# Patient Record
Sex: MALE | Race: WHITE | Employment: OTHER
[De-identification: names, ages, dates, MRNs, and addresses within clinical notes are randomized per-mention and may not be internally consistent; named-entity substitution may affect disease eponyms.]

---

## 2022-03-18 PROBLEM — H25.89 OTHER AGE-RELATED CATARACT: Status: ACTIVE | Noted: 2021-03-29

## 2022-03-18 PROBLEM — Z86.010 HX OF COLONIC POLYPS: Status: ACTIVE | Noted: 2019-08-20

## 2022-03-18 PROBLEM — Z86.0100 HX OF COLONIC POLYPS: Status: ACTIVE | Noted: 2019-08-20

## 2022-03-19 PROBLEM — I83.891 VARICOSE VEINS OF LEG WITH EDEMA, RIGHT: Status: ACTIVE | Noted: 2020-08-26

## 2024-04-19 SDOH — HEALTH STABILITY: PHYSICAL HEALTH: ON AVERAGE, HOW MANY DAYS PER WEEK DO YOU ENGAGE IN MODERATE TO STRENUOUS EXERCISE (LIKE A BRISK WALK)?: 4 DAYS

## 2024-04-19 SDOH — HEALTH STABILITY: PHYSICAL HEALTH: ON AVERAGE, HOW MANY MINUTES DO YOU ENGAGE IN EXERCISE AT THIS LEVEL?: 90 MIN

## 2024-04-22 ENCOUNTER — OFFICE VISIT (OUTPATIENT)
Dept: INTERNAL MEDICINE CLINIC | Facility: CLINIC | Age: 69
End: 2024-04-22
Payer: MEDICARE

## 2024-04-22 VITALS
BODY MASS INDEX: 27.57 KG/M2 | DIASTOLIC BLOOD PRESSURE: 84 MMHG | SYSTOLIC BLOOD PRESSURE: 127 MMHG | OXYGEN SATURATION: 97 % | HEIGHT: 73 IN | TEMPERATURE: 98.1 F | HEART RATE: 58 BPM | WEIGHT: 208 LBS

## 2024-04-22 DIAGNOSIS — M25.542 ARTHRALGIA OF LEFT HAND: ICD-10-CM

## 2024-04-22 DIAGNOSIS — Z12.5 PROSTATE CANCER SCREENING: ICD-10-CM

## 2024-04-22 DIAGNOSIS — E78.2 MIXED HYPERLIPIDEMIA: ICD-10-CM

## 2024-04-22 DIAGNOSIS — M25.542 ARTHRALGIA OF LEFT HAND: Primary | ICD-10-CM

## 2024-04-22 DIAGNOSIS — Z00.00 MEDICARE ANNUAL WELLNESS VISIT, INITIAL: ICD-10-CM

## 2024-04-22 DIAGNOSIS — H91.93 BILATERAL HEARING LOSS, UNSPECIFIED HEARING LOSS TYPE: ICD-10-CM

## 2024-04-22 LAB
ALBUMIN SERPL-MCNC: 4.1 G/DL (ref 3.2–4.6)
ALBUMIN/GLOB SERPL: 1.6 (ref 0.4–1.6)
ALP SERPL-CCNC: 57 U/L (ref 50–136)
ALT SERPL-CCNC: 28 U/L (ref 12–65)
ANION GAP SERPL CALC-SCNC: 5 MMOL/L (ref 2–11)
APPEARANCE UR: CLEAR
AST SERPL-CCNC: 25 U/L (ref 15–37)
BASOPHILS # BLD: 0 K/UL (ref 0–0.2)
BASOPHILS NFR BLD: 1 % (ref 0–2)
BILIRUB DIRECT SERPL-MCNC: 0.2 MG/DL
BILIRUB SERPL-MCNC: 0.7 MG/DL (ref 0.2–1.1)
BILIRUB UR QL: NEGATIVE
BUN SERPL-MCNC: 19 MG/DL (ref 8–23)
CALCIUM SERPL-MCNC: 9.8 MG/DL (ref 8.3–10.4)
CHLORIDE SERPL-SCNC: 109 MMOL/L (ref 103–113)
CHOLEST SERPL-MCNC: 140 MG/DL
CO2 SERPL-SCNC: 25 MMOL/L (ref 21–32)
COLOR UR: NORMAL
CREAT SERPL-MCNC: 1.1 MG/DL (ref 0.8–1.5)
CRP SERPL-MCNC: <0.3 MG/DL (ref 0–0.9)
DIFFERENTIAL METHOD BLD: ABNORMAL
EOSINOPHIL # BLD: 0 K/UL (ref 0–0.8)
EOSINOPHIL NFR BLD: 0 % (ref 0.5–7.8)
ERYTHROCYTE [DISTWIDTH] IN BLOOD BY AUTOMATED COUNT: 12.1 % (ref 11.9–14.6)
ERYTHROCYTE [SEDIMENTATION RATE] IN BLOOD: 1 MM/HR
GLOBULIN SER CALC-MCNC: 2.6 G/DL (ref 2.8–4.5)
GLUCOSE SERPL-MCNC: 87 MG/DL (ref 65–100)
GLUCOSE UR STRIP.AUTO-MCNC: NEGATIVE MG/DL
HCT VFR BLD AUTO: 41.7 % (ref 41.1–50.3)
HDLC SERPL-MCNC: 63 MG/DL (ref 40–60)
HDLC SERPL: 2.2
HGB BLD-MCNC: 14.4 G/DL (ref 13.6–17.2)
HGB UR QL STRIP: NEGATIVE
IMM GRANULOCYTES # BLD AUTO: 0 K/UL (ref 0–0.5)
IMM GRANULOCYTES NFR BLD AUTO: 0 % (ref 0–5)
KETONES UR QL STRIP.AUTO: NEGATIVE MG/DL
LDLC SERPL CALC-MCNC: 61.4 MG/DL
LEUKOCYTE ESTERASE UR QL STRIP.AUTO: NEGATIVE
LYMPHOCYTES # BLD: 1 K/UL (ref 0.5–4.6)
LYMPHOCYTES NFR BLD: 12 % (ref 13–44)
MCH RBC QN AUTO: 30.7 PG (ref 26.1–32.9)
MCHC RBC AUTO-ENTMCNC: 34.5 G/DL (ref 31.4–35)
MCV RBC AUTO: 88.9 FL (ref 82–102)
MONOCYTES # BLD: 0.5 K/UL (ref 0.1–1.3)
MONOCYTES NFR BLD: 6 % (ref 4–12)
NEUTS SEG # BLD: 6.5 K/UL (ref 1.7–8.2)
NEUTS SEG NFR BLD: 81 % (ref 43–78)
NITRITE UR QL STRIP.AUTO: NEGATIVE
NRBC # BLD: 0 K/UL (ref 0–0.2)
PH UR STRIP: 5 (ref 5–9)
PLATELET # BLD AUTO: 153 K/UL (ref 150–450)
PMV BLD AUTO: 12.1 FL (ref 9.4–12.3)
POTASSIUM SERPL-SCNC: 4 MMOL/L (ref 3.5–5.1)
PROT SERPL-MCNC: 6.7 G/DL (ref 6.3–8.2)
PROT UR STRIP-MCNC: NEGATIVE MG/DL
PSA SERPL-MCNC: 0.9 NG/ML
RBC # BLD AUTO: 4.69 M/UL (ref 4.23–5.6)
SODIUM SERPL-SCNC: 139 MMOL/L (ref 136–146)
SP GR UR REFRACTOMETRY: 1.02 (ref 1–1.02)
TRIGL SERPL-MCNC: 78 MG/DL (ref 35–150)
TSH, 3RD GENERATION: 1.78 UIU/ML (ref 0.36–3.74)
UROBILINOGEN UR QL STRIP.AUTO: 1 EU/DL (ref 0.2–1)
VLDLC SERPL CALC-MCNC: 15.6 MG/DL (ref 6–23)
WBC # BLD AUTO: 8.1 K/UL (ref 4.3–11.1)

## 2024-04-22 PROCEDURE — 1123F ACP DISCUSS/DSCN MKR DOCD: CPT | Performed by: INTERNAL MEDICINE

## 2024-04-22 PROCEDURE — 1036F TOBACCO NON-USER: CPT | Performed by: INTERNAL MEDICINE

## 2024-04-22 PROCEDURE — G0438 PPPS, INITIAL VISIT: HCPCS | Performed by: INTERNAL MEDICINE

## 2024-04-22 PROCEDURE — 3017F COLORECTAL CA SCREEN DOC REV: CPT | Performed by: INTERNAL MEDICINE

## 2024-04-22 PROCEDURE — 99204 OFFICE O/P NEW MOD 45 MIN: CPT | Performed by: INTERNAL MEDICINE

## 2024-04-22 PROCEDURE — G8427 DOCREV CUR MEDS BY ELIG CLIN: HCPCS | Performed by: INTERNAL MEDICINE

## 2024-04-22 PROCEDURE — G8417 CALC BMI ABV UP PARAM F/U: HCPCS | Performed by: INTERNAL MEDICINE

## 2024-04-22 RX ORDER — ATORVASTATIN CALCIUM 20 MG/1
20 TABLET, FILM COATED ORAL DAILY
COMMUNITY
Start: 2024-02-01

## 2024-04-22 SDOH — ECONOMIC STABILITY: FOOD INSECURITY: WITHIN THE PAST 12 MONTHS, THE FOOD YOU BOUGHT JUST DIDN'T LAST AND YOU DIDN'T HAVE MONEY TO GET MORE.: NEVER TRUE

## 2024-04-22 SDOH — ECONOMIC STABILITY: INCOME INSECURITY: HOW HARD IS IT FOR YOU TO PAY FOR THE VERY BASICS LIKE FOOD, HOUSING, MEDICAL CARE, AND HEATING?: NOT HARD AT ALL

## 2024-04-22 SDOH — ECONOMIC STABILITY: FOOD INSECURITY: WITHIN THE PAST 12 MONTHS, YOU WORRIED THAT YOUR FOOD WOULD RUN OUT BEFORE YOU GOT MONEY TO BUY MORE.: NEVER TRUE

## 2024-04-22 SDOH — ECONOMIC STABILITY: HOUSING INSECURITY
IN THE LAST 12 MONTHS, WAS THERE A TIME WHEN YOU DID NOT HAVE A STEADY PLACE TO SLEEP OR SLEPT IN A SHELTER (INCLUDING NOW)?: NO

## 2024-04-22 ASSESSMENT — LIFESTYLE VARIABLES
HOW OFTEN DO YOU HAVE A DRINK CONTAINING ALCOHOL: NEVER
HOW MANY STANDARD DRINKS CONTAINING ALCOHOL DO YOU HAVE ON A TYPICAL DAY: PATIENT DOES NOT DRINK

## 2024-04-22 ASSESSMENT — ENCOUNTER SYMPTOMS
DIARRHEA: 0
EYE ITCHING: 0
NAUSEA: 0
WHEEZING: 0
BACK PAIN: 0
COLOR CHANGE: 0
VOICE CHANGE: 0
VOMITING: 0
SORE THROAT: 0
EYE DISCHARGE: 0
ANAL BLEEDING: 0
RHINORRHEA: 0
ABDOMINAL PAIN: 0
COUGH: 0
BLOOD IN STOOL: 0
SHORTNESS OF BREATH: 0
CONSTIPATION: 0

## 2024-04-22 ASSESSMENT — PATIENT HEALTH QUESTIONNAIRE - PHQ9
SUM OF ALL RESPONSES TO PHQ QUESTIONS 1-9: 0
SUM OF ALL RESPONSES TO PHQ QUESTIONS 1-9: 0
2. FEELING DOWN, DEPRESSED OR HOPELESS: NOT AT ALL
1. LITTLE INTEREST OR PLEASURE IN DOING THINGS: NOT AT ALL
SUM OF ALL RESPONSES TO PHQ9 QUESTIONS 1 & 2: 0
SUM OF ALL RESPONSES TO PHQ QUESTIONS 1-9: 0
SUM OF ALL RESPONSES TO PHQ QUESTIONS 1-9: 0

## 2024-04-22 ASSESSMENT — ANXIETY QUESTIONNAIRES
IF YOU CHECKED OFF ANY PROBLEMS ON THIS QUESTIONNAIRE, HOW DIFFICULT HAVE THESE PROBLEMS MADE IT FOR YOU TO DO YOUR WORK, TAKE CARE OF THINGS AT HOME, OR GET ALONG WITH OTHER PEOPLE: NOT DIFFICULT AT ALL
2. NOT BEING ABLE TO STOP OR CONTROL WORRYING: NOT AT ALL
6. BECOMING EASILY ANNOYED OR IRRITABLE: NOT AT ALL
1. FEELING NERVOUS, ANXIOUS, OR ON EDGE: NOT AT ALL
GAD7 TOTAL SCORE: 0
7. FEELING AFRAID AS IF SOMETHING AWFUL MIGHT HAPPEN: NOT AT ALL
5. BEING SO RESTLESS THAT IT IS HARD TO SIT STILL: NOT AT ALL
3. WORRYING TOO MUCH ABOUT DIFFERENT THINGS: NOT AT ALL
4. TROUBLE RELAXING: NOT AT ALL

## 2024-04-22 NOTE — PROGRESS NOTES
Uli Gavin M.D.  [place] [address]  Phone: [phone]  Fax: [phone]    Hand Pain   Incident onset: No specific incident.  New problem. Incident location: Plays Pickleball. There was no injury mechanism. The pain is present in the right hand (1st MCP joint.  Scattered PIPs/DIPs.). The quality of the pain is described as aching. The pain does not radiate. The pain is moderate. The pain has been Fluctuating since the incident. Pertinent negatives include no chest pain. The symptoms are aggravated by movement and palpation. He has tried nothing for the symptoms.        Lukas Maki is a 68 y.o. White (non-) male.     No current outpatient medications on file.     No current facility-administered medications for this visit.     Allergies   Allergen Reactions    Codeine Other (See Comments)     Says it made him feel warm    Sulfa Antibiotics Nausea Only     Past Medical History:   Diagnosis Date    Colon polyp     History of cancer 2011    of vocal cords  with excision and radiation     Past Surgical History:   Procedure Laterality Date    APPENDECTOMY      COLONOSCOPY  2019    ORTHOPEDIC SURGERY  1998    removed bursa on right elbow    OTHER SURGICAL HISTORY      cancer on the vocal cords     Social History     Tobacco Use    Smoking status: Former     Current packs/day: 0.00     Types: Cigarettes     Quit date: 1997     Years since quittin.3    Smokeless tobacco: Former     Quit date: 1997    Tobacco comments:     Quit smoking: used smokeless tobacco - and quit that   Substance Use Topics    Alcohol use: No    Drug use: No     Family History   Problem Relation Age of Onset    Other Brother         varicose vein     Heart Disease Mother     COPD Father     No Known Problems Sister       Review of Systems   Constitutional:  Negative for chills, diaphoresis, fatigue and fever.   HENT:  Positive for hearing loss.

## 2024-04-22 NOTE — PATIENT INSTRUCTIONS
screenings are paid in full while other may be subject to a deductible, co-insurance, and/or copay.    Some of these benefits include a comprehensive review of your medical history including lifestyle, illnesses that may run in your family, and various assessments and screenings as appropriate.    After reviewing your medical record and screening and assessments performed today your provider may have ordered immunizations, labs, imaging, and/or referrals for you.  A list of these orders (if applicable) as well as your Preventive Care list are included within your After Visit Summary for your review.    Other Preventive Recommendations:    A preventive eye exam performed by an eye specialist is recommended every 1-2 years to screen for glaucoma; cataracts, macular degeneration, and other eye disorders.  A preventive dental visit is recommended every 6 months.  Try to get at least 150 minutes of exercise per week or 10,000 steps per day on a pedometer .  Order or download the FREE \"Exercise & Physical Activity: Your Everyday Guide\" from [employer]. Call 1-344.302.7439 or search [employer] online.  You need 1568-1077 mg of calcium and 1238-1914 IU of vitamin D per day. It is possible to meet your calcium requirement with diet alone, but a vitamin D supplement is usually necessary to meet this goal.  When exposed to the sun, use a sunscreen that protects against both UVA and UVB radiation with an SPF of 30 or greater. Reapply every 2 to 3 hours or after sweating, drying off with a towel, or swimming.  Always wear a seat belt when traveling in a car. Always wear a helmet when riding a bicycle or motorcycle.

## 2024-04-23 LAB
CCP IGA+IGG SERPL IA-ACNC: 4 UNITS (ref 0–19)
RHEUMATOID FACT SER QL LA: NEGATIVE

## 2024-06-20 ENCOUNTER — OFFICE VISIT (OUTPATIENT)
Dept: INTERNAL MEDICINE CLINIC | Facility: CLINIC | Age: 69
End: 2024-06-20
Payer: MEDICARE

## 2024-06-20 VITALS
BODY MASS INDEX: 27.57 KG/M2 | DIASTOLIC BLOOD PRESSURE: 83 MMHG | SYSTOLIC BLOOD PRESSURE: 129 MMHG | HEART RATE: 55 BPM | OXYGEN SATURATION: 95 % | TEMPERATURE: 98 F | HEIGHT: 73 IN | WEIGHT: 208 LBS | RESPIRATION RATE: 16 BRPM

## 2024-06-20 DIAGNOSIS — J02.9 PHARYNGITIS, UNSPECIFIED ETIOLOGY: Primary | ICD-10-CM

## 2024-06-20 PROCEDURE — 3017F COLORECTAL CA SCREEN DOC REV: CPT | Performed by: INTERNAL MEDICINE

## 2024-06-20 PROCEDURE — 1036F TOBACCO NON-USER: CPT | Performed by: INTERNAL MEDICINE

## 2024-06-20 PROCEDURE — 99213 OFFICE O/P EST LOW 20 MIN: CPT | Performed by: INTERNAL MEDICINE

## 2024-06-20 PROCEDURE — 1123F ACP DISCUSS/DSCN MKR DOCD: CPT | Performed by: INTERNAL MEDICINE

## 2024-06-20 PROCEDURE — G8417 CALC BMI ABV UP PARAM F/U: HCPCS | Performed by: INTERNAL MEDICINE

## 2024-06-20 PROCEDURE — G8427 DOCREV CUR MEDS BY ELIG CLIN: HCPCS | Performed by: INTERNAL MEDICINE

## 2024-06-20 RX ORDER — DOXYCYCLINE HYCLATE 100 MG
100 TABLET ORAL 2 TIMES DAILY
Qty: 20 TABLET | Refills: 0 | Status: SHIPPED | OUTPATIENT
Start: 2024-06-20 | End: 2024-06-30

## 2024-06-20 ASSESSMENT — ENCOUNTER SYMPTOMS
SORE THROAT: 1
SWOLLEN GLANDS: 0
COUGH: 0

## 2024-06-20 NOTE — PROGRESS NOTES
Uli Gavin M.D.  [place] [address]  Phone: [phone]  Fax: [phone]    Pharyngitis  This is a new problem. The current episode started in the past 7 days (2 days.). The problem occurs constantly. The problem has been unchanged. Associated symptoms include a sore throat. Pertinent negatives include no arthralgias, chills, congestion, coughing, diaphoresis, fever, headaches or swollen glands. Nothing aggravates the symptoms. Treatments tried: Zyrtec. The treatment provided mild relief.        Lukas Maki is a 69 y.o. White (non-) male.     Current Outpatient Medications   Medication Sig Dispense Refill    atorvastatin (LIPITOR) 20 MG tablet Take 1 tablet by mouth daily       No current facility-administered medications for this visit.     Allergies   Allergen Reactions    Codeine Other (See Comments)     Says it made him feel warm    Sulfa Antibiotics Nausea Only    Epinephrine Palpitations     Past Medical History:   Diagnosis Date    Colon polyp     History of cancer     of vocal cords  with excision and radiation    HLD (hyperlipidemia)      Past Surgical History:   Procedure Laterality Date    APPENDECTOMY      COLONOSCOPY  2019    ORTHOPEDIC SURGERY      removed bursa on right elbow    OTHER SURGICAL HISTORY      cancer on the vocal cords     Social History     Tobacco Use    Smoking status: Former     Current packs/day: 0.00     Average packs/day: 0.5 packs/day for 22.0 years (11.0 ttl pk-yrs)     Types: Cigarettes     Start date: 1975     Quit date: 1997     Years since quittin.4    Smokeless tobacco: Former     Quit date: 1997    Tobacco comments:     Quit smoking: used smokeless tobacco - and quit that   Substance Use Topics    Alcohol use: No    Drug use: No     Family History   Problem Relation Age of Onset    Heart Disease Mother     COPD Father     No Known Problems Sister     Other

## 2024-10-02 NOTE — TELEPHONE ENCOUNTER
Uli Gavin MD, patient out of refills of atorvastatin. Rx pended for your signature/modification as appropriate    Last Visit: 24  Next Visit: 10/25/24    Thank you,  Evon Salmon, PharmD, [facility]  Population Health Pharmacist  [facility]  Department, toll free: [phone], option 1  =======================================================================================  POPULATION HEALTH CLINICAL PHARMACY: ADHERENCE REVIEW  Identified care gap per [employer]: fills at [facility]: Statin adherence    ASSESSMENT  STATIN ADHERENCE    Insurance Records claims through 2024 (Prior Year PDC = not reported; YTD PDC = 76%; Potential Fail Date: 10/4/24):   Atorvastatin 20 mg tab last filled on 24 for 90 day supply. Next refill due: 24  Last Rxer SAMMY MAGALLANES - prior [facility] PCP; est care with Dr. Gavin 24    Prescribed si tablet/capsule daily - historical on med list    Per Reconcile Dispense History:   ATORVASTATIN 20MG TABLETS 2024 90 90 each Sammy Magallanes MD [facility] #...   ATORVASTATIN 20MG TABLETS 2024 90 90 each Sammy Magallanes MD [facility]...   ATORVASTATIN 20MG TABLETS 10/05/2023 90 90 each Sammy Magallanes MD [facility] #...   ATORVASTATIN 20MG TABLETS 2023 90 90 each Sammy Magallanes MD [facility] #...   ATORVASTATIN 20MG TABLETS 2023 30 30 each Sammy Magallanes MD [facility] #...   0RR per hyperlink; per [facility] last Rx 2/1/24 x 90 day supply 1 refill - believe needs refill Rx from new PCP    Lab Results   Component Value Date    CHOL 140 2024    TRIG 78 2024    HDL 63 (H) 2024     Lab Results   Component Value Date    LDL 61.4 2024      ALT   Date Value Ref Range Status   2024 28 12 - 65 U/L Final     AST   Date Value Ref Range Status   2024 25 15 - 37 U/L Final     The 10-year ASCVD risk score (Lida PRICE, et al., 2019) is: 10.6%    Values used to

## 2024-10-03 RX ORDER — ATORVASTATIN CALCIUM 20 MG/1
20 TABLET, FILM COATED ORAL DAILY
Qty: 90 TABLET | Refills: 0 | Status: SHIPPED | OUTPATIENT
Start: 2024-10-03

## 2024-10-04 NOTE — TELEPHONE ENCOUNTER
Rx signed by provider - thank you!    MyChart message sent to patient.    For Pharmacy Admin Tracking Only    Program: [facility]  CPA in place:  No  Recommendation Provided To: Provider: 1 via Note to Provider  Intervention Detail: Adherence Monitorin and Refill(s) Provided  Intervention Accepted By: Provider: 1  Gap Closed?: No   Time Spent (min): 15

## 2024-10-07 RX ORDER — ATORVASTATIN CALCIUM 20 MG/1
20 TABLET, FILM COATED ORAL DAILY
Qty: 90 TABLET | Refills: 0 | OUTPATIENT
Start: 2024-10-07

## 2024-10-25 ENCOUNTER — OFFICE VISIT (OUTPATIENT)
Dept: INTERNAL MEDICINE CLINIC | Facility: CLINIC | Age: 69
End: 2024-10-25

## 2024-10-25 VITALS
HEIGHT: 73 IN | HEART RATE: 51 BPM | OXYGEN SATURATION: 99 % | TEMPERATURE: 97.3 F | WEIGHT: 213.8 LBS | BODY MASS INDEX: 28.34 KG/M2 | SYSTOLIC BLOOD PRESSURE: 156 MMHG | DIASTOLIC BLOOD PRESSURE: 91 MMHG

## 2024-10-25 DIAGNOSIS — E78.2 MIXED HYPERLIPIDEMIA: ICD-10-CM

## 2024-10-25 DIAGNOSIS — E78.2 MIXED HYPERLIPIDEMIA: Primary | ICD-10-CM

## 2024-10-25 DIAGNOSIS — R03.0 ELEVATED BP WITHOUT DIAGNOSIS OF HYPERTENSION: ICD-10-CM

## 2024-10-25 PROBLEM — I83.891 VARICOSE VEINS OF LEG WITH EDEMA, RIGHT: Status: RESOLVED | Noted: 2020-08-26 | Resolved: 2024-10-25

## 2024-10-25 PROBLEM — H25.89 OTHER AGE-RELATED CATARACT: Status: RESOLVED | Noted: 2021-03-29 | Resolved: 2024-10-25

## 2024-10-25 PROBLEM — Z86.0100 HX OF COLONIC POLYPS: Status: RESOLVED | Noted: 2019-08-20 | Resolved: 2024-10-25

## 2024-10-25 LAB
ALBUMIN SERPL-MCNC: 3.8 G/DL (ref 3.2–4.6)
ALBUMIN/GLOB SERPL: 1.4 (ref 1–1.9)
ALP SERPL-CCNC: 51 U/L (ref 40–129)
ALT SERPL-CCNC: 20 U/L (ref 8–55)
ANION GAP SERPL CALC-SCNC: 7 MMOL/L (ref 9–18)
AST SERPL-CCNC: 28 U/L (ref 15–37)
BASOPHILS # BLD: 0 K/UL (ref 0–0.2)
BASOPHILS NFR BLD: 1 % (ref 0–2)
BILIRUB DIRECT SERPL-MCNC: <0.2 MG/DL (ref 0–0.4)
BILIRUB SERPL-MCNC: 0.7 MG/DL (ref 0–1.2)
BUN SERPL-MCNC: 21 MG/DL (ref 8–23)
CALCIUM SERPL-MCNC: 9.7 MG/DL (ref 8.8–10.2)
CHLORIDE SERPL-SCNC: 104 MMOL/L (ref 98–107)
CO2 SERPL-SCNC: 28 MMOL/L (ref 20–28)
CREAT SERPL-MCNC: 1.04 MG/DL (ref 0.8–1.3)
DIFFERENTIAL METHOD BLD: ABNORMAL
EOSINOPHIL # BLD: 0.1 K/UL (ref 0–0.8)
EOSINOPHIL NFR BLD: 1 % (ref 0.5–7.8)
ERYTHROCYTE [DISTWIDTH] IN BLOOD BY AUTOMATED COUNT: 12.2 % (ref 11.9–14.6)
GLOBULIN SER CALC-MCNC: 2.8 G/DL (ref 2.3–3.5)
GLUCOSE SERPL-MCNC: 97 MG/DL (ref 70–99)
HCT VFR BLD AUTO: 44.9 % (ref 41.1–50.3)
HGB BLD-MCNC: 15 G/DL (ref 13.6–17.2)
IMM GRANULOCYTES # BLD AUTO: 0 K/UL (ref 0–0.5)
IMM GRANULOCYTES NFR BLD AUTO: 1 % (ref 0–5)
LYMPHOCYTES # BLD: 1.4 K/UL (ref 0.5–4.6)
LYMPHOCYTES NFR BLD: 21 % (ref 13–44)
MCH RBC QN AUTO: 30 PG (ref 26.1–32.9)
MCHC RBC AUTO-ENTMCNC: 33.4 G/DL (ref 31.4–35)
MCV RBC AUTO: 89.8 FL (ref 82–102)
MONOCYTES # BLD: 0.7 K/UL (ref 0.1–1.3)
MONOCYTES NFR BLD: 10 % (ref 4–12)
NEUTS SEG # BLD: 4.3 K/UL (ref 1.7–8.2)
NEUTS SEG NFR BLD: 66 % (ref 43–78)
NRBC # BLD: 0 K/UL (ref 0–0.2)
PLATELET # BLD AUTO: 131 K/UL (ref 150–450)
PMV BLD AUTO: 11.8 FL (ref 9.4–12.3)
POTASSIUM SERPL-SCNC: 4.4 MMOL/L (ref 3.5–5.1)
PROT SERPL-MCNC: 6.6 G/DL (ref 6.3–8.2)
RBC # BLD AUTO: 5 M/UL (ref 4.23–5.6)
SODIUM SERPL-SCNC: 140 MMOL/L (ref 136–145)
WBC # BLD AUTO: 6.5 K/UL (ref 4.3–11.1)

## 2024-10-25 RX ORDER — ATORVASTATIN CALCIUM 20 MG/1
20 TABLET, FILM COATED ORAL DAILY
Qty: 90 TABLET | Refills: 1 | Status: SHIPPED | OUTPATIENT
Start: 2024-10-25

## 2024-10-25 ASSESSMENT — PATIENT HEALTH QUESTIONNAIRE - PHQ9
SUM OF ALL RESPONSES TO PHQ QUESTIONS 1-9: 0
SUM OF ALL RESPONSES TO PHQ QUESTIONS 1-9: 0
2. FEELING DOWN, DEPRESSED OR HOPELESS: NOT AT ALL
SUM OF ALL RESPONSES TO PHQ9 QUESTIONS 1 & 2: 0
SUM OF ALL RESPONSES TO PHQ QUESTIONS 1-9: 0
SUM OF ALL RESPONSES TO PHQ QUESTIONS 1-9: 0
1. LITTLE INTEREST OR PLEASURE IN DOING THINGS: NOT AT ALL

## 2024-10-25 NOTE — PROGRESS NOTES
Uli Gavin M.D.  [place] [address]  Phone: [phone]  Fax: [phone]    Hyperlipidemia  This is a chronic problem.      Lukas Maki is a 69 y.o. White (non-) male.     Current Outpatient Medications   Medication Sig Dispense Refill    atorvastatin (LIPITOR) 20 MG tablet Take 1 tablet by mouth daily 90 tablet 0     No current facility-administered medications for this visit.     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)      Past Surgical History:   Procedure Laterality Date    APPENDECTOMY      COLONOSCOPY  2019    ORTHOPEDIC SURGERY  1998    removed bursa on right elbow    OTHER SURGICAL HISTORY      cancer on the vocal cords     Social History     Tobacco Use    Smoking status: Former     Current packs/day: 0.00     Average packs/day: 0.5 packs/day for 22.0 years (11.0 ttl pk-yrs)     Types: Cigarettes     Start date: 1975     Quit date: 1997     Years since quittin.8    Smokeless tobacco: Former     Quit date: 1997   Vaping Use    Vaping status: Never Used   Substance Use Topics    Alcohol use: No    Drug use: No     Family History   Problem Relation Age of Onset    Heart Disease Mother     COPD Father     No Known Problems Sister     Cancer Neg Hx     Diabetes Neg Hx       Review of Systems   Physical Exam  Vitals and nursing note reviewed.   Constitutional:       General: He is not in acute distress.     Appearance: Normal appearance. He is not ill-appearing, toxic-appearing or diaphoretic.   HENT:      Head: Normocephalic and atraumatic.   Cardiovascular:      Rate and Rhythm: Normal rate and regular rhythm.      Heart sounds: Normal heart sounds. No murmur heard.     No friction rub. No gallop.   Pulmonary:      Effort: Pulmonary effort is normal. No respiratory distress.      Breath sounds:

## 2024-11-20 ENCOUNTER — OFFICE VISIT (OUTPATIENT)
Dept: ENT CLINIC | Age: 69
End: 2024-11-20
Payer: MEDICARE

## 2024-11-20 ENCOUNTER — OFFICE VISIT (OUTPATIENT)
Dept: AUDIOLOGY | Age: 69
End: 2024-11-20
Payer: MEDICARE

## 2024-11-20 VITALS
HEIGHT: 73 IN | BODY MASS INDEX: 28.6 KG/M2 | WEIGHT: 215.8 LBS | DIASTOLIC BLOOD PRESSURE: 82 MMHG | SYSTOLIC BLOOD PRESSURE: 130 MMHG

## 2024-11-20 DIAGNOSIS — H93.13 TINNITUS OF BOTH EARS: Chronic | ICD-10-CM

## 2024-11-20 DIAGNOSIS — H90.3 SENSORINEURAL HEARING LOSS (SNHL) OF BOTH EARS: Primary | Chronic | ICD-10-CM

## 2024-11-20 DIAGNOSIS — H90.3 SENSORINEURAL HEARING LOSS, BILATERAL: Primary | ICD-10-CM

## 2024-11-20 PROCEDURE — 1123F ACP DISCUSS/DSCN MKR DOCD: CPT | Performed by: PHYSICIAN ASSISTANT

## 2024-11-20 PROCEDURE — 92557 COMPREHENSIVE HEARING TEST: CPT | Performed by: AUDIOLOGIST

## 2024-11-20 PROCEDURE — 3017F COLORECTAL CA SCREEN DOC REV: CPT | Performed by: PHYSICIAN ASSISTANT

## 2024-11-20 PROCEDURE — 1125F AMNT PAIN NOTED PAIN PRSNT: CPT | Performed by: PHYSICIAN ASSISTANT

## 2024-11-20 PROCEDURE — G8427 DOCREV CUR MEDS BY ELIG CLIN: HCPCS | Performed by: PHYSICIAN ASSISTANT

## 2024-11-20 PROCEDURE — G8417 CALC BMI ABV UP PARAM F/U: HCPCS | Performed by: PHYSICIAN ASSISTANT

## 2024-11-20 PROCEDURE — 1036F TOBACCO NON-USER: CPT | Performed by: PHYSICIAN ASSISTANT

## 2024-11-20 PROCEDURE — 1160F RVW MEDS BY RX/DR IN RCRD: CPT | Performed by: PHYSICIAN ASSISTANT

## 2024-11-20 PROCEDURE — 92567 TYMPANOMETRY: CPT | Performed by: AUDIOLOGIST

## 2024-11-20 PROCEDURE — 1159F MED LIST DOCD IN RCRD: CPT | Performed by: PHYSICIAN ASSISTANT

## 2024-11-20 PROCEDURE — 99204 OFFICE O/P NEW MOD 45 MIN: CPT | Performed by: PHYSICIAN ASSISTANT

## 2024-11-20 PROCEDURE — G8484 FLU IMMUNIZE NO ADMIN: HCPCS | Performed by: PHYSICIAN ASSISTANT

## 2024-11-20 RX ORDER — BISACODYL 5 MG/1
10 TABLET, DELAYED RELEASE ORAL ONCE
COMMUNITY
Start: 2024-11-14

## 2024-11-20 RX ORDER — POLYETHYLENE GLYCOL 3350, SODIUM CHLORIDE, SODIUM BICARBONATE, POTASSIUM CHLORIDE 420; 11.2; 5.72; 1.48 G/4L; G/4L; G/4L; G/4L
4000 POWDER, FOR SOLUTION ORAL ONCE
COMMUNITY
Start: 2024-11-14

## 2024-11-20 RX ORDER — TRIAMCINOLONE ACETONIDE 1 MG/G
CREAM TOPICAL
COMMUNITY
Start: 2024-10-15

## 2024-11-20 ASSESSMENT — ENCOUNTER SYMPTOMS
EYES NEGATIVE: 1
GASTROINTESTINAL NEGATIVE: 1
RESPIRATORY NEGATIVE: 1
ALLERGIC/IMMUNOLOGIC NEGATIVE: 1

## 2024-11-20 NOTE — PROGRESS NOTES
AUDIOLOGY EVALUATION    Lukas Maki had Tympanometry and Audiometry performed today.    The patient reports hearing loss.     Results as follows:    Tympanometry    Type A -  on right  Type Ad -  on left    Audiometry    Test Performed - Comprehensive Audiogram    Type of Loss - Right Ear: abnormal hearing: degree of loss is normal to moderately severe sensorineural hearing loss                           Left Ear: abnormal hearing: degree of loss is normal to moderately severe sensorineural hearing loss     SRT   Measurement Right Ear Left Ear   Value 20 20   Unit dB dB     Discrimination  Measurement Right Ear Left Ear   Value 76% 64%   Unit dB dB     Recommend  Binaural amplification and annual audios    Taty Joy [facility]  Audiologist

## 2024-11-20 NOTE — PROGRESS NOTES
Lukas Maki is a 69 y.o. White (non-) male presents today with c/o hearing loss. Pt feels a gradual worsening over time. Non-pulsatile tinnitus is constant but not too bothersome. Pt has not worn hearing aids before. 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 He is having difficulty with loud background noise environments.     He further mentions R sided ear pain that is worse at night. He notes it as a sharp stabbing that feels deep in the ear. IT comes and goes.      Audiogram:     Left ear:  normal sloping to moderately severe SNHL  Right ear: normal sloping to moderately severe SNHL  Discrimination score: Right ear 76 % and Left ear 64 %   Tympanogram: Right ear Type A and Left ear Type Ad.       Chief Complaint   Patient presents with    New Patient    Hearing Problem     Bilateral hearing loss.  Was seen back when Nanette was here.  He had a HT done then, he just feels like his hearing has gradually decreased over the years and is getting worse.  Having some recent pain in the right ear.  Denies drainage and itching.  Mentions tinnitus that is constant.  Describes it has a high pitched ringing.         Patient Active Problem List   Diagnosis    Mixed hyperlipidemia        Reviewed and updated this visit by provider:  Tobacco  Allergies  Meds  Problems  Med Hx  Surg Hx  Fam Hx         Review of Systems   Constitutional: Negative.    HENT:  Positive for ear pain, hearing loss and tinnitus. Negative for ear discharge.    Eyes: Negative.    Respiratory: Negative.     Cardiovascular: Negative.    Gastrointestinal: Negative.    Endocrine: Negative.    Genitourinary: Negative.    Musculoskeletal: Negative.    Skin: Negative.    Allergic/Immunologic: Negative.    Neurological: Negative.    Hematological: Negative.    Psychiatric/Behavioral: Negative.          /82 (Site: Right Upper Arm, Position: Sitting)   Ht 1.854 m (6' 1\")   Wt 97.9 kg (215 lb

## 2025-04-17 ENCOUNTER — OFFICE VISIT (OUTPATIENT)
Dept: INTERNAL MEDICINE CLINIC | Facility: CLINIC | Age: 70
End: 2025-04-17

## 2025-04-17 VITALS
HEIGHT: 73 IN | TEMPERATURE: 97.3 F | DIASTOLIC BLOOD PRESSURE: 84 MMHG | RESPIRATION RATE: 16 BRPM | HEART RATE: 54 BPM | SYSTOLIC BLOOD PRESSURE: 142 MMHG | WEIGHT: 214 LBS | OXYGEN SATURATION: 97 % | BODY MASS INDEX: 28.36 KG/M2

## 2025-04-17 DIAGNOSIS — Z12.5 PROSTATE CANCER SCREENING: ICD-10-CM

## 2025-04-17 DIAGNOSIS — M54.2 NECK PAIN: ICD-10-CM

## 2025-04-17 DIAGNOSIS — E78.2 MIXED HYPERLIPIDEMIA: ICD-10-CM

## 2025-04-17 DIAGNOSIS — R03.0 WHITE COAT SYNDROME WITHOUT HYPERTENSION: ICD-10-CM

## 2025-04-17 DIAGNOSIS — Z00.00 MEDICARE ANNUAL WELLNESS VISIT, SUBSEQUENT: Primary | ICD-10-CM

## 2025-04-17 LAB
ALBUMIN SERPL-MCNC: 3.9 G/DL (ref 3.2–4.6)
ALBUMIN/GLOB SERPL: 1.6 (ref 1–1.9)
ALP SERPL-CCNC: 51 U/L (ref 40–129)
ALT SERPL-CCNC: 29 U/L (ref 8–55)
ANION GAP SERPL CALC-SCNC: 7 MMOL/L (ref 7–16)
APPEARANCE UR: CLEAR
AST SERPL-CCNC: 37 U/L (ref 15–37)
BACTERIA URNS QL MICRO: 0 /HPF
BASOPHILS # BLD: 0.04 K/UL (ref 0–0.2)
BASOPHILS NFR BLD: 0.9 % (ref 0–2)
BILIRUB DIRECT SERPL-MCNC: 0.3 MG/DL (ref 0–0.3)
BILIRUB SERPL-MCNC: 0.7 MG/DL (ref 0–1.2)
BILIRUB UR QL: NEGATIVE
BUN SERPL-MCNC: 20 MG/DL (ref 8–23)
CALCIUM SERPL-MCNC: 9.5 MG/DL (ref 8.8–10.2)
CHLORIDE SERPL-SCNC: 104 MMOL/L (ref 98–107)
CHOLEST SERPL-MCNC: 140 MG/DL (ref 0–200)
CO2 SERPL-SCNC: 29 MMOL/L (ref 20–29)
COLOR UR: NORMAL
CREAT SERPL-MCNC: 0.98 MG/DL (ref 0.8–1.3)
DIFFERENTIAL METHOD BLD: ABNORMAL
EOSINOPHIL # BLD: 0.05 K/UL (ref 0–0.8)
EOSINOPHIL NFR BLD: 1.1 % (ref 0.5–7.8)
ERYTHROCYTE [DISTWIDTH] IN BLOOD BY AUTOMATED COUNT: 12.2 % (ref 11.9–14.6)
GLOBULIN SER CALC-MCNC: 2.5 G/DL (ref 2.3–3.5)
GLUCOSE SERPL-MCNC: 91 MG/DL (ref 70–99)
GLUCOSE UR STRIP.AUTO-MCNC: NEGATIVE MG/DL
HCT VFR BLD AUTO: 44 % (ref 41.1–50.3)
HDLC SERPL-MCNC: 56 MG/DL (ref 40–60)
HDLC SERPL: 2.5 (ref 0–5)
HGB BLD-MCNC: 15.5 G/DL (ref 13.6–17.2)
HGB UR QL STRIP: NEGATIVE
IMM GRANULOCYTES # BLD AUTO: 0.01 K/UL (ref 0–0.5)
IMM GRANULOCYTES NFR BLD AUTO: 0.2 % (ref 0–5)
KETONES UR QL STRIP.AUTO: NEGATIVE MG/DL
LDLC SERPL CALC-MCNC: 70 MG/DL (ref 0–100)
LEUKOCYTE ESTERASE UR QL STRIP.AUTO: NEGATIVE
LYMPHOCYTES # BLD: 1.28 K/UL (ref 0.5–4.6)
LYMPHOCYTES NFR BLD: 27.5 % (ref 13–44)
MCH RBC QN AUTO: 30.6 PG (ref 26.1–32.9)
MCHC RBC AUTO-ENTMCNC: 35.2 G/DL (ref 31.4–35)
MCV RBC AUTO: 87 FL (ref 82–102)
MONOCYTES # BLD: 0.41 K/UL (ref 0.1–1.3)
MONOCYTES NFR BLD: 8.8 % (ref 4–12)
NEUTS SEG # BLD: 2.87 K/UL (ref 1.7–8.2)
NEUTS SEG NFR BLD: 61.5 % (ref 43–78)
NITRITE UR QL STRIP.AUTO: NEGATIVE
NRBC # BLD: 0 K/UL (ref 0–0.2)
PH UR STRIP: 5.5 (ref 5–9)
PLATELET # BLD AUTO: 130 K/UL (ref 150–450)
PMV BLD AUTO: 11.9 FL (ref 9.4–12.3)
POTASSIUM SERPL-SCNC: 4.1 MMOL/L (ref 3.5–5.1)
PROT SERPL-MCNC: 6.3 G/DL (ref 6.3–8.2)
PROT UR STRIP-MCNC: NEGATIVE MG/DL
PSA SERPL-MCNC: 0.9 NG/ML (ref 0–4)
RBC # BLD AUTO: 5.06 M/UL (ref 4.23–5.6)
SODIUM SERPL-SCNC: 141 MMOL/L (ref 136–145)
SP GR UR REFRACTOMETRY: 1.01 (ref 1–1.02)
TRIGL SERPL-MCNC: 71 MG/DL (ref 0–150)
TSH, 3RD GENERATION: 1.93 UIU/ML (ref 0.27–4.2)
UROBILINOGEN UR QL STRIP.AUTO: 0.2 EU/DL (ref 0.2–1)
VLDLC SERPL CALC-MCNC: 14 MG/DL (ref 6–23)
WBC # BLD AUTO: 4.7 K/UL (ref 4.3–11.1)

## 2025-04-17 RX ORDER — ATORVASTATIN CALCIUM 20 MG/1
20 TABLET, FILM COATED ORAL DAILY
Qty: 100 TABLET | Refills: 1 | Status: SHIPPED | OUTPATIENT
Start: 2025-04-17

## 2025-04-17 SDOH — HEALTH STABILITY: PHYSICAL HEALTH: ON AVERAGE, HOW MANY DAYS PER WEEK DO YOU ENGAGE IN MODERATE TO STRENUOUS EXERCISE (LIKE A BRISK WALK)?: 5 DAYS

## 2025-04-17 SDOH — ECONOMIC STABILITY: FOOD INSECURITY: WITHIN THE PAST 12 MONTHS, YOU WORRIED THAT YOUR FOOD WOULD RUN OUT BEFORE YOU GOT MONEY TO BUY MORE.: NEVER TRUE

## 2025-04-17 SDOH — ECONOMIC STABILITY: INCOME INSECURITY: IN THE LAST 12 MONTHS, WAS THERE A TIME WHEN YOU WERE NOT ABLE TO PAY THE MORTGAGE OR RENT ON TIME?: NO

## 2025-04-17 SDOH — ECONOMIC STABILITY: FOOD INSECURITY: WITHIN THE PAST 12 MONTHS, THE FOOD YOU BOUGHT JUST DIDN'T LAST AND YOU DIDN'T HAVE MONEY TO GET MORE.: NEVER TRUE

## 2025-04-17 SDOH — ECONOMIC STABILITY: TRANSPORTATION INSECURITY
IN THE PAST 12 MONTHS, HAS THE LACK OF TRANSPORTATION KEPT YOU FROM MEDICAL APPOINTMENTS OR FROM GETTING MEDICATIONS?: NO

## 2025-04-17 SDOH — ECONOMIC STABILITY: TRANSPORTATION INSECURITY
IN THE PAST 12 MONTHS, HAS LACK OF TRANSPORTATION KEPT YOU FROM MEETINGS, WORK, OR FROM GETTING THINGS NEEDED FOR DAILY LIVING?: NO

## 2025-04-17 SDOH — HEALTH STABILITY: PHYSICAL HEALTH: ON AVERAGE, HOW MANY MINUTES DO YOU ENGAGE IN EXERCISE AT THIS LEVEL?: 60 MIN

## 2025-04-17 ASSESSMENT — PATIENT HEALTH QUESTIONNAIRE - PHQ9
SUM OF ALL RESPONSES TO PHQ QUESTIONS 1-9: 0
SUM OF ALL RESPONSES TO PHQ QUESTIONS 1-9: 0
2. FEELING DOWN, DEPRESSED OR HOPELESS: NOT AT ALL
1. LITTLE INTEREST OR PLEASURE IN DOING THINGS: NOT AT ALL
SUM OF ALL RESPONSES TO PHQ QUESTIONS 1-9: 0
SUM OF ALL RESPONSES TO PHQ QUESTIONS 1-9: 0

## 2025-04-17 ASSESSMENT — LIFESTYLE VARIABLES
HOW OFTEN DO YOU HAVE SIX OR MORE DRINKS ON ONE OCCASION: 1
HOW MANY STANDARD DRINKS CONTAINING ALCOHOL DO YOU HAVE ON A TYPICAL DAY: 0
HOW MANY STANDARD DRINKS CONTAINING ALCOHOL DO YOU HAVE ON A TYPICAL DAY: PATIENT DOES NOT DRINK
HOW OFTEN DO YOU HAVE A DRINK CONTAINING ALCOHOL: MONTHLY OR LESS
HOW OFTEN DO YOU HAVE A DRINK CONTAINING ALCOHOL: 2

## 2025-04-17 ASSESSMENT — ENCOUNTER SYMPTOMS
BLOOD IN STOOL: 0
SHORTNESS OF BREATH: 0

## 2025-04-17 NOTE — PATIENT INSTRUCTIONS
Please arrive 20 minutes prior to your scheduled time to see the doctor to allow sufficient time for check-in and rooming.      Please bring all your prescription bottles to each appointment.      I recommend all standard healthcare maintenance and cancer screenings (Colon cancer screening if due, Lung cancer screening if due, Mammogram and Bone Density if female and appropriate, etc) and standard CDC recommended immunizations (https://www.cdc.gov/vaccines-adults/recommended-vaccines/index.html and https://www.cdc.gov/vaccines/imz-schedules/adult-easyread.html  ) as appropriate and due (unless contraindicated)  to lower your risk for potentially preventable morbidity/mortality from these diseases.     Check BP 1-2 times per week.  Call our office if BP runs above 140/80.     Recommend tobacco cessation if you use tobacco products.         Learning About Vision Tests  What are vision tests?     The four most common vision tests are visual acuity tests, refraction, visual field tests, and color vision tests.  Visual acuity (sharpness) tests  These tests are used:  To see if you need glasses or contact lenses.  To monitor an eye problem.  To check an eye injury.  Visual acuity tests are done as part of routine exams. You may also have this test when you get your 's license or apply for some types of jobs.  Visual field tests  These tests are used:  To check for vision loss in any area of your range of vision.  To screen for certain eye diseases.  To look for nerve damage after a stroke, head injury, or other problem that could reduce blood flow to the brain.  Refraction and color tests  A refraction test is done to find the right prescription for glasses and contact lenses.  A color vision test is done to check for color blindness.  Color vision is often tested as part of a routine exam. You may also have this test when you apply for a job where recognizing different colors is important, such as ,

## 2025-04-17 NOTE — PROGRESS NOTES
CBC with Auto Differential; Future  -     Lipid Panel; Future  -     Hepatic Function Panel; Future  -     TSH; Future  -     Urinalysis; Future    Prostate cancer screening  -     PSA Screening; Future      Return in about 6 months (around 10/17/2025).    Medicare Annual Wellness Visit    Lukas Maki is here for Hyperlipidemia and Medicare AWV    Assessment & Plan   Medicare annual wellness visit, subsequent  Mixed hyperlipidemia  -     Basic Metabolic Panel; Future  -     CBC with Auto Differential; Future  -     Lipid Panel; Future  -     Hepatic Function Panel; Future  -     TSH; Future  -     Urinalysis; Future  -     atorvastatin (LIPITOR) 20 MG tablet; Take 1 tablet by mouth daily, Disp-100 tablet, R-1Normal  White coat syndrome without hypertension  -     Basic Metabolic Panel; Future  -     CBC with Auto Differential; Future  -     Lipid Panel; Future  -     Hepatic Function Panel; Future  -     TSH; Future  -     Urinalysis; Future  Prostate cancer screening  -     PSA Screening; Future       Return in about 6 months (around 10/17/2025).     Subjective       Patient's complete Health Risk Assessment and screening values have been reviewed and are found in Flowsheets. The following problems were reviewed today and where indicated follow up appointments were made and/or referrals ordered.    Positive Risk Factor Screenings with Interventions:                   Vision Screen:  Do you have difficulty driving, watching TV, or doing any of your daily activities because of your eyesight?: (Patient-Rptd) No  Have you had an eye exam within the past year?: (!) (Patient-Rptd) No  Interventions:   See AVS for additional education material    Safety:  Do you have non-slip mats or non-slip surfaces or shower bars or grab bars in your shower or bathtub?: (!) (Patient-Rptd) No  Interventions:  See AVS for additional education material     Advanced Directives:  Do you have a Living Will?: (!) (Patient-Rptd)

## 2025-04-18 ENCOUNTER — RESULTS FOLLOW-UP (OUTPATIENT)
Dept: INTERNAL MEDICINE CLINIC | Facility: CLINIC | Age: 70
End: 2025-04-18

## 2025-04-22 ENCOUNTER — OFFICE VISIT (OUTPATIENT)
Age: 70
End: 2025-04-22
Payer: MEDICARE

## 2025-04-22 DIAGNOSIS — M54.2 NECK PAIN: ICD-10-CM

## 2025-04-22 DIAGNOSIS — M47.812 CERVICAL FACET JOINT SYNDROME: Primary | ICD-10-CM

## 2025-04-22 PROCEDURE — G8417 CALC BMI ABV UP PARAM F/U: HCPCS | Performed by: PHYSICIAN ASSISTANT

## 2025-04-22 PROCEDURE — 99204 OFFICE O/P NEW MOD 45 MIN: CPT | Performed by: PHYSICIAN ASSISTANT

## 2025-04-22 PROCEDURE — G8427 DOCREV CUR MEDS BY ELIG CLIN: HCPCS | Performed by: PHYSICIAN ASSISTANT

## 2025-04-22 PROCEDURE — 1123F ACP DISCUSS/DSCN MKR DOCD: CPT | Performed by: PHYSICIAN ASSISTANT

## 2025-04-22 PROCEDURE — 1036F TOBACCO NON-USER: CPT | Performed by: PHYSICIAN ASSISTANT

## 2025-04-22 PROCEDURE — 3017F COLORECTAL CA SCREEN DOC REV: CPT | Performed by: PHYSICIAN ASSISTANT

## 2025-04-22 PROCEDURE — 1159F MED LIST DOCD IN RCRD: CPT | Performed by: PHYSICIAN ASSISTANT

## 2025-04-22 RX ORDER — CELECOXIB 200 MG/1
200 CAPSULE ORAL DAILY
Qty: 30 CAPSULE | Refills: 0 | Status: SHIPPED | OUTPATIENT
Start: 2025-04-22 | End: 2025-04-22

## 2025-04-22 RX ORDER — CELECOXIB 200 MG/1
200 CAPSULE ORAL DAILY
Qty: 30 CAPSULE | Refills: 0 | Status: SHIPPED | OUTPATIENT
Start: 2025-04-22 | End: 2025-05-22

## 2025-04-22 NOTE — PROGRESS NOTES
An MRI of the Cervical spine has been ordered. A referral to PT has been ordered of the Cervical spine.

## 2025-04-25 ENCOUNTER — TELEPHONE (OUTPATIENT)
Dept: ORTHOPEDIC SURGERY | Age: 70
End: 2025-04-25

## 2025-04-25 NOTE — TELEPHONE ENCOUNTER
He is having a MRI on Monday morning and is having some anxiety. He would like something called in to [facility] in [place] for this.

## 2025-05-01 ENCOUNTER — OFFICE VISIT (OUTPATIENT)
Age: 70
End: 2025-05-01
Payer: MEDICARE

## 2025-05-01 DIAGNOSIS — M50.30 DDD (DEGENERATIVE DISC DISEASE), CERVICAL: ICD-10-CM

## 2025-05-01 DIAGNOSIS — M47.812 CERVICAL FACET JOINT SYNDROME: Primary | ICD-10-CM

## 2025-05-01 DIAGNOSIS — M48.02 CERVICAL SPINAL STENOSIS: ICD-10-CM

## 2025-05-01 DIAGNOSIS — M48.02 FORAMINAL STENOSIS OF CERVICAL REGION: ICD-10-CM

## 2025-05-01 PROCEDURE — G8417 CALC BMI ABV UP PARAM F/U: HCPCS | Performed by: PHYSICIAN ASSISTANT

## 2025-05-01 PROCEDURE — 3017F COLORECTAL CA SCREEN DOC REV: CPT | Performed by: PHYSICIAN ASSISTANT

## 2025-05-01 PROCEDURE — G8428 CUR MEDS NOT DOCUMENT: HCPCS | Performed by: PHYSICIAN ASSISTANT

## 2025-05-01 PROCEDURE — 99214 OFFICE O/P EST MOD 30 MIN: CPT | Performed by: PHYSICIAN ASSISTANT

## 2025-05-01 PROCEDURE — 1123F ACP DISCUSS/DSCN MKR DOCD: CPT | Performed by: PHYSICIAN ASSISTANT

## 2025-05-01 PROCEDURE — 1036F TOBACCO NON-USER: CPT | Performed by: PHYSICIAN ASSISTANT

## 2025-05-01 NOTE — PROGRESS NOTES
Name: Lukas Maki  YOB: 1955  Gender: male  MRN: [id number]    Chief complaint: Results (Mri review)       History of present illness:    History of Present Illness  A 69-year-old male presents with right neck pain and stiffness during overhead work, pickleball, weightlifting, and yard work.     Pain is localized to the right base of the skull extending to the upper scapula, without radiation to the shoulders or arms, and no associated weakness, numbness, or tingling. The condition has been ongoing for several years. Chiropractic care twice a week for 5 weeks and massage therapy have provided temporary relief, and OTC ibuprofen offers some relief.  The patient experiences tension headaches related to neck stiffness. There are no other medical problems.     Exercise: Pickleball, weightlifting, yard work      5/1/25: The patient presents for a follow-up regarding a cervical MRI scan.    The patient reports a positive therapeutic response to the prescribed medication, noting accelerated resolution of inflammation and a reduction in muscular tension and pain. Physical therapy is scheduled to commence on 05/13/2025. The pain remains localized to the cervical region, with no evidence of radicular symptoms extending into the upper extremities.    There have not been associated changes in fine motor skills such as handwriting and buttoning buttons. There have not been changes in gait since the onset of the symptoms. The patient has not had cervical surgery in the past.                4/22/2025     1:35 PM   AMB PAIN ASSESSMENT   Location of Pain Neck   Location Modifiers Right   Severity of Pain 5   Quality of Pain Dull;Aching;Other (Comment)    Duration of Pain Persistent   Frequency of Pain Intermittent   Date Pain First Started 3/5/2020   Limiting Behavior Some   Result of Injury No   Work-Related Injury No   Are there other pain locations you wish to document? No       Significant value

## 2025-05-13 ENCOUNTER — EVALUATION (OUTPATIENT)
Age: 70
End: 2025-05-13
Payer: MEDICARE

## 2025-05-13 DIAGNOSIS — M53.82 IMPAIRED RANGE OF MOTION OF CERVICAL SPINE: ICD-10-CM

## 2025-05-13 DIAGNOSIS — Z74.09 DECREASED FUNCTIONAL MOBILITY AND ENDURANCE: ICD-10-CM

## 2025-05-13 DIAGNOSIS — G44.86 CERVICOGENIC HEADACHE: ICD-10-CM

## 2025-05-13 DIAGNOSIS — Z78.9 IMPAIRED MOTOR CONTROL: ICD-10-CM

## 2025-05-13 DIAGNOSIS — M54.2 NECK PAIN ON RIGHT SIDE: Primary | ICD-10-CM

## 2025-05-13 DIAGNOSIS — M25.60 JOINT STIFFNESS OF SPINE: ICD-10-CM

## 2025-05-13 PROCEDURE — 97110 THERAPEUTIC EXERCISES: CPT

## 2025-05-13 PROCEDURE — 97161 PT EVAL LOW COMPLEX 20 MIN: CPT

## 2025-05-13 PROCEDURE — 97140 MANUAL THERAPY 1/> REGIONS: CPT

## 2025-05-13 NOTE — PROGRESS NOTES
[facility]  [address] [address]  Dept: [phone]     Physical Therapy Initial Assessment     Referring MD: Rebecca Escalona, GOMEZ  Diagnosis:     ICD-10-CM    1. Neck pain on right side  M54.2       2. Joint stiffness of spine  M25.60       3. Impaired range of motion of cervical spine  M53.82       4. Cervicogenic headache  G44.86       5. Impaired motor control  Z78.9       6. Decreased functional mobility and endurance  Z74.09          Surgery: n/a    Therapy precautions:None  Co-morbidities affecting plan of care: hx of Squamous cell carcinoma of vocal cord     Payor: Payor: [facility] MEDICARE /  /  /  Billing pattern: Government- total time    Time in: 1120  Time out: 1200  Total Timed Procedure Codes: 25 min, Total Time: 40 min  Modifier needed: No    Episode visit count:  1     PERTINENT MEDICAL HISTORY     Past medical and surgical history:   Past Medical History:   Diagnosis Date    Colon polyp     Headache 1980    periodic headaches    HLD (hyperlipidemia)     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  Cervical facet arthropathy.  Has reviewed the cervical spine reveals straightening of the normal lordotic curve with degenerative disc changes at C4-C6.  No acute abnormality.     X-ray impression: Degenerative changes cervical spine    MRI CERVICAL SPINE WO CONTRAST 04/28/2025  Degenerative disc changes at C4-5 with mild bulging mild ligamentum thickening with some facet arthropathy no significant

## 2025-05-16 ENCOUNTER — TREATMENT (OUTPATIENT)
Age: 70
End: 2025-05-16

## 2025-05-16 DIAGNOSIS — M25.60 JOINT STIFFNESS OF SPINE: ICD-10-CM

## 2025-05-16 DIAGNOSIS — M53.82 IMPAIRED RANGE OF MOTION OF CERVICAL SPINE: ICD-10-CM

## 2025-05-16 DIAGNOSIS — M54.2 NECK PAIN ON RIGHT SIDE: Primary | ICD-10-CM

## 2025-05-16 DIAGNOSIS — Z74.09 DECREASED FUNCTIONAL MOBILITY AND ENDURANCE: ICD-10-CM

## 2025-05-16 DIAGNOSIS — G44.86 CERVICOGENIC HEADACHE: ICD-10-CM

## 2025-05-16 DIAGNOSIS — Z78.9 IMPAIRED MOTOR CONTROL: ICD-10-CM

## 2025-05-16 NOTE — PROGRESS NOTES
[facility]  [address] [address]  Dept: [phone]     Physical Therapy Daily Note     Referring MD: Rebecca Escalona, GOMEZ  Diagnosis:     ICD-10-CM    1. Neck pain on right side  M54.2       2. Joint stiffness of spine  M25.60       3. Impaired range of motion of cervical spine  M53.82       4. Cervicogenic headache  G44.86       5. Impaired motor control  Z78.9       6. Decreased functional mobility and endurance  Z74.09          Surgery: n/a    Therapy precautions:None  Co-morbidities affecting plan of care: hx of Squamous cell carcinoma of vocal cord     Payor: Payor: [facility] MEDICARE /  /  /  Billing pattern: Government- total time    Time in: 8:57 am  Time out: 9:54  Modifier needed: No    Episode visit count:  Visit count could not be calculated. Make sure you are using a visit which is associated with an episode.     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  Cervical facet arthropathy.  Has reviewed the cervical spine reveals straightening of the normal lordotic curve with degenerative disc changes at C4-C6.  No acute abnormality.     X-ray impression: Degenerative changes cervical spine    Chief complaints/history of injury: Patient reports longstanding difficulty with R side neck pain and stiffness with overhead

## 2025-05-20 ENCOUNTER — TREATMENT (OUTPATIENT)
Age: 70
End: 2025-05-20
Payer: MEDICARE

## 2025-05-20 DIAGNOSIS — G44.86 CERVICOGENIC HEADACHE: ICD-10-CM

## 2025-05-20 DIAGNOSIS — Z74.09 DECREASED FUNCTIONAL MOBILITY AND ENDURANCE: ICD-10-CM

## 2025-05-20 DIAGNOSIS — M53.82 IMPAIRED RANGE OF MOTION OF CERVICAL SPINE: ICD-10-CM

## 2025-05-20 DIAGNOSIS — M54.2 NECK PAIN ON RIGHT SIDE: Primary | ICD-10-CM

## 2025-05-20 DIAGNOSIS — M25.60 JOINT STIFFNESS OF SPINE: ICD-10-CM

## 2025-05-20 DIAGNOSIS — Z78.9 IMPAIRED MOTOR CONTROL: ICD-10-CM

## 2025-05-20 PROCEDURE — 97110 THERAPEUTIC EXERCISES: CPT

## 2025-05-20 PROCEDURE — 97140 MANUAL THERAPY 1/> REGIONS: CPT

## 2025-05-20 NOTE — PROGRESS NOTES
[facility] ORTHOPAEDICS  [address] [facility] [address]  Dept: [phone]     Physical Therapy Daily Note     Referring MD: Rebecca Escalona, GOMEZ  Diagnosis:     ICD-10-CM    1. Neck pain on right side  M54.2       2. Joint stiffness of spine  M25.60       3. Impaired range of motion of cervical spine  M53.82       4. Cervicogenic headache  G44.86       5. Impaired motor control  Z78.9       6. Decreased functional mobility and endurance  Z74.09          Surgery: n/a    Therapy precautions:None  Co-morbidities affecting plan of care: hx of Squamous cell carcinoma of vocal cord     Chief complaints/history of injury: Patient reports longstanding difficulty with R side neck pain and stiffness with overhead activities, laying on the side, and recreational activities. He also will have onset of headache with increased tightness and stiffness, which tends to last for 24 hours before alleviating. He has attempted massage therapy and chiropractic care, which gave some relief, but symptoms will return within a day or two. He does not recall a specific LEIF, only that symptoms will onset with longer duration activities.    Date symptoms began: multiple years  Nature of condition: Chronic (continuous duration > 3 months)  Primary cause of current episode: Repetitive  How did symptoms start: see above  Describe current symptoms: tightness and stiffness of CTJ, episodic HA    Patient Stated Goals: manage symptoms for improved tolerance of recreation    Initial Evaluation: 5/13/25  Last Progress Note: n/a  Total Visits: 3   Payor: Payor: [facility] MEDICARE /  /  /   Billing pattern: Government- total time     Time In: 1340 Time Out: 1420  Total Timed Codes: 40 min, Total Treatment Time: 40 min  Modifier needed: No    SUBJECTIVE     Pt reports excellent tolerance of HEP and recreation since his previous visit. He played pickleball for ~2.5 hours yesterday, so there is familiar tightness along the R side neck to the

## 2025-05-22 ENCOUNTER — TREATMENT (OUTPATIENT)
Age: 70
End: 2025-05-22
Payer: MEDICARE

## 2025-05-22 DIAGNOSIS — M54.2 NECK PAIN ON RIGHT SIDE: Primary | ICD-10-CM

## 2025-05-22 DIAGNOSIS — M25.60 JOINT STIFFNESS OF SPINE: ICD-10-CM

## 2025-05-22 DIAGNOSIS — Z74.09 DECREASED FUNCTIONAL MOBILITY AND ENDURANCE: ICD-10-CM

## 2025-05-22 DIAGNOSIS — M53.82 IMPAIRED RANGE OF MOTION OF CERVICAL SPINE: ICD-10-CM

## 2025-05-22 DIAGNOSIS — Z78.9 IMPAIRED MOTOR CONTROL: ICD-10-CM

## 2025-05-22 DIAGNOSIS — G44.86 CERVICOGENIC HEADACHE: ICD-10-CM

## 2025-05-22 PROCEDURE — 97110 THERAPEUTIC EXERCISES: CPT

## 2025-05-22 PROCEDURE — 97140 MANUAL THERAPY 1/> REGIONS: CPT

## 2025-05-22 NOTE — PROGRESS NOTES
[facility] ORTHOPAEDICS  [address] [facility] [address]  Dept: [phone]     Physical Therapy Daily Note     Referring MD: Rebecca Escalona, GOMEZ  Diagnosis:     ICD-10-CM    1. Neck pain on right side  M54.2       2. Joint stiffness of spine  M25.60       3. Impaired range of motion of cervical spine  M53.82       4. Cervicogenic headache  G44.86       5. Impaired motor control  Z78.9       6. Decreased functional mobility and endurance  Z74.09          Surgery: n/a    Therapy precautions:None  Co-morbidities affecting plan of care: hx of Squamous cell carcinoma of vocal cord     Chief complaints/history of injury: Patient reports longstanding difficulty with R side neck pain and stiffness with overhead activities, laying on the side, and recreational activities. He also will have onset of headache with increased tightness and stiffness, which tends to last for 24 hours before alleviating. He has attempted massage therapy and chiropractic care, which gave some relief, but symptoms will return within a day or two. He does not recall a specific LEIF, only that symptoms will onset with longer duration activities.    Date symptoms began: multiple years  Nature of condition: Chronic (continuous duration > 3 months)  Primary cause of current episode: Repetitive  How did symptoms start: see above  Describe current symptoms: tightness and stiffness of CTJ, episodic HA    Patient Stated Goals: manage symptoms for improved tolerance of recreation    Initial Evaluation: 5/13/25  Last Progress Note: n/a  Total Visits: 4   Payor: Payor: [facility] MEDICARE /  /  /   Billing pattern: Government- total time     Time In: 1045 Time Out: 1120  Total Timed Codes: 35 min, Total Treatment Time: 35 min  Modifier needed: No    SUBJECTIVE     Pt continues to feel more comfortable overall. He has been consistently performing his HEP prior to activities, noting only global muscle soreness and the expected stiffness following more

## 2025-05-29 ENCOUNTER — TREATMENT (OUTPATIENT)
Age: 70
End: 2025-05-29

## 2025-05-29 DIAGNOSIS — M25.60 JOINT STIFFNESS OF SPINE: ICD-10-CM

## 2025-05-29 DIAGNOSIS — Z74.09 DECREASED FUNCTIONAL MOBILITY AND ENDURANCE: ICD-10-CM

## 2025-05-29 DIAGNOSIS — Z78.9 IMPAIRED MOTOR CONTROL: ICD-10-CM

## 2025-05-29 DIAGNOSIS — G44.86 CERVICOGENIC HEADACHE: ICD-10-CM

## 2025-05-29 DIAGNOSIS — M53.82 IMPAIRED RANGE OF MOTION OF CERVICAL SPINE: ICD-10-CM

## 2025-05-29 DIAGNOSIS — M54.2 NECK PAIN ON RIGHT SIDE: Primary | ICD-10-CM

## 2025-05-29 NOTE — PROGRESS NOTES
[facility] ORTHOPAEDICS  [address] [facility] [address]  Dept: [phone]     Physical Therapy Daily Note     Referring MD: Rebecca Escalona, GOMEZ  Diagnosis:     ICD-10-CM    1. Neck pain on right side  M54.2       2. Joint stiffness of spine  M25.60       3. Impaired range of motion of cervical spine  M53.82       4. Cervicogenic headache  G44.86       5. Impaired motor control  Z78.9       6. Decreased functional mobility and endurance  Z74.09          Surgery: n/a    Therapy precautions:None  Co-morbidities affecting plan of care: hx of Squamous cell carcinoma of vocal cord     Chief complaints/history of injury: Patient reports longstanding difficulty with R side neck pain and stiffness with overhead activities, laying on the side, and recreational activities. He also will have onset of headache with increased tightness and stiffness, which tends to last for 24 hours before alleviating. He has attempted massage therapy and chiropractic care, which gave some relief, but symptoms will return within a day or two. He does not recall a specific LEIF, only that symptoms will onset with longer duration activities.    Date symptoms began: multiple years  Nature of condition: Chronic (continuous duration > 3 months)  Primary cause of current episode: Repetitive  How did symptoms start: see above  Describe current symptoms: tightness and stiffness of CTJ, episodic HA    Patient Stated Goals: manage symptoms for improved tolerance of recreation    Initial Evaluation: 5/13/25  Last Progress Note: n/a  Total Visits: 5   Payor: Payor: [facility] MEDICARE /  /  /   Billing pattern: Government- total time     Time In: 1620 Time Out: 1655  Total Timed Codes: 35 min, Total Treatment Time: 35 min  Modifier needed: No    SUBJECTIVE     Pt reports less consistency with HEP over the weekend, as his daughter was in town, but he has still been active. He can tell there is more tightness along the CTJ.    Home program

## 2025-06-17 ENCOUNTER — TREATMENT (OUTPATIENT)
Age: 70
End: 2025-06-17
Payer: MEDICARE

## 2025-06-17 DIAGNOSIS — M54.2 NECK PAIN ON RIGHT SIDE: Primary | ICD-10-CM

## 2025-06-17 DIAGNOSIS — M25.60 JOINT STIFFNESS OF SPINE: ICD-10-CM

## 2025-06-17 DIAGNOSIS — M53.82 IMPAIRED RANGE OF MOTION OF CERVICAL SPINE: ICD-10-CM

## 2025-06-17 PROCEDURE — 97140 MANUAL THERAPY 1/> REGIONS: CPT

## 2025-06-17 PROCEDURE — 97110 THERAPEUTIC EXERCISES: CPT

## 2025-06-17 RX ORDER — CELECOXIB 200 MG/1
200 CAPSULE ORAL DAILY
Qty: 30 CAPSULE | Refills: 0 | OUTPATIENT
Start: 2025-06-17 | End: 2025-07-17

## 2025-06-17 NOTE — PROGRESS NOTES
[facility] ORTHOPAEDICS  [address] [facility] [address]  Dept: [phone]     Physical Therapy Discharge     Referring MD: Rebecca Escalona, GOMEZ  Diagnosis:     ICD-10-CM    1. Neck pain on right side  M54.2       2. Joint stiffness of spine  M25.60       3. Impaired range of motion of cervical spine  M53.82          Surgery: n/a    Therapy precautions:None  Co-morbidities affecting plan of care: hx of Squamous cell carcinoma of vocal cord     Chief complaints/history of injury: Patient reports longstanding difficulty with R side neck pain and stiffness with overhead activities, laying on the side, and recreational activities. He also will have onset of headache with increased tightness and stiffness, which tends to last for 24 hours before alleviating. He has attempted massage therapy and chiropractic care, which gave some relief, but symptoms will return within a day or two. He does not recall a specific LEIF, only that symptoms will onset with longer duration activities.    Date symptoms began: multiple years  Nature of condition: Chronic (continuous duration > 3 months)  Primary cause of current episode: Repetitive  How did symptoms start: see above  Describe current symptoms: tightness and stiffness of CTJ, episodic HA    Patient Stated Goals: manage symptoms for improved tolerance of recreation    Initial Evaluation: 5/13/25  Last Progress Note: n/a  Total Visits: 6   Payor: Payor: [facility] MEDICARE /  /  /   Billing pattern: Government- total time     Time In: 1420 Time Out: 1445  Total Timed Codes: 25 min, Total Treatment Time: 25 min  Modifier needed: No    SUBJECTIVE     Pt reports excellent tolerance of all activities, noting he will feel muscular tightness following more intense sport sessions, which then leads to a sense of stiffness in his neck, but without headache, and not limiting. He has been successfully managing symptoms with his HEP, using the TRX motions for active recovery.    Home

## 2025-07-22 ENCOUNTER — PATIENT MESSAGE (OUTPATIENT)
Dept: INTERNAL MEDICINE CLINIC | Facility: CLINIC | Age: 70
End: 2025-07-22

## 2025-07-22 DIAGNOSIS — Z13.6 ENCOUNTER FOR SCREENING FOR CORONARY ARTERY DISEASE: Primary | ICD-10-CM
